# Patient Record
Sex: MALE | URBAN - METROPOLITAN AREA
[De-identification: names, ages, dates, MRNs, and addresses within clinical notes are randomized per-mention and may not be internally consistent; named-entity substitution may affect disease eponyms.]

---

## 2023-07-17 ENCOUNTER — OFFICE VISIT (OUTPATIENT)
Dept: URBAN - METROPOLITAN AREA CLINIC 33 | Facility: CLINIC | Age: 53
End: 2023-07-17
Payer: MEDICAID

## 2023-07-17 DIAGNOSIS — E11.9 TYPE 2 DIABETES MELLITUS W/O COMPLICATION: Primary | ICD-10-CM

## 2023-07-17 DIAGNOSIS — H11.012 AMYLOID PTERYGIUM OF LEFT EYE: ICD-10-CM

## 2023-07-17 DIAGNOSIS — H52.4 PRESBYOPIA: ICD-10-CM

## 2023-07-17 PROCEDURE — 99204 OFFICE O/P NEW MOD 45 MIN: CPT

## 2023-07-17 ASSESSMENT — VISUAL ACUITY
OS: 20/20
OD: 20/20

## 2023-07-17 ASSESSMENT — INTRAOCULAR PRESSURE
OS: 14
OD: 14

## 2023-07-17 NOTE — IMPRESSION/PLAN
Impression: Type 2 diabetes mellitus w/o complication: C28.1. Plan: Patient educated on all ocular findings. Stressed importance of following up with primary care physician, tight control over blood sugar, maintaining healthy diet, strict adherence to medication, and exercise. Patient is advised that a yearly ophthalmic exam is recommended. Return sooner if any new symptoms.

## 2023-07-17 NOTE — IMPRESSION/PLAN
Impression: Amyloid pterygium of left eye: H11.012. Plan: Patient educated on ocular findings. Recommended patient to wear sunglasses when outdoors and to use artificial tears four times a day. No surgical intervention recommended at this time. Continue to monitor.